# Patient Record
Sex: FEMALE | Race: WHITE | HISPANIC OR LATINO | URBAN - METROPOLITAN AREA
[De-identification: names, ages, dates, MRNs, and addresses within clinical notes are randomized per-mention and may not be internally consistent; named-entity substitution may affect disease eponyms.]

---

## 2019-01-14 ENCOUNTER — OUTPATIENT (OUTPATIENT)
Dept: OUTPATIENT SERVICES | Facility: HOSPITAL | Age: 24
LOS: 1 days | End: 2019-01-14

## 2019-01-14 ENCOUNTER — APPOINTMENT (OUTPATIENT)
Dept: MRI IMAGING | Facility: HOSPITAL | Age: 24
End: 2019-01-14
Payer: COMMERCIAL

## 2019-01-14 DIAGNOSIS — Q04.6 CONGENITAL CEREBRAL CYSTS: ICD-10-CM

## 2019-01-14 PROCEDURE — 70551 MRI BRAIN STEM W/O DYE: CPT | Mod: 26

## 2019-02-28 ENCOUNTER — INPATIENT (INPATIENT)
Facility: HOSPITAL | Age: 24
LOS: 1 days | Discharge: HOME | End: 2019-03-02
Attending: NEUROLOGICAL SURGERY | Admitting: NEUROLOGICAL SURGERY
Payer: COMMERCIAL

## 2019-02-28 ENCOUNTER — TRANSCRIPTION ENCOUNTER (OUTPATIENT)
Age: 24
End: 2019-02-28

## 2019-02-28 VITALS
TEMPERATURE: 98 F | DIASTOLIC BLOOD PRESSURE: 69 MMHG | OXYGEN SATURATION: 100 % | RESPIRATION RATE: 18 BRPM | SYSTOLIC BLOOD PRESSURE: 139 MMHG | HEART RATE: 68 BPM

## 2019-02-28 DIAGNOSIS — T85.618A BREAKDOWN (MECHANICAL) OF OTHER SPECIFIED INTERNAL PROSTHETIC DEVICES, IMPLANTS AND GRAFTS, INITIAL ENCOUNTER: ICD-10-CM

## 2019-02-28 LAB
ALBUMIN SERPL ELPH-MCNC: 4.3 G/DL — SIGNIFICANT CHANGE UP (ref 3.3–5)
ALP SERPL-CCNC: 64 U/L — SIGNIFICANT CHANGE UP (ref 40–120)
ALT FLD-CCNC: 10 U/L — SIGNIFICANT CHANGE UP (ref 4–33)
ANION GAP SERPL CALC-SCNC: 17 MMO/L — HIGH (ref 7–14)
APTT BLD: 26 SEC — LOW (ref 27.5–36.3)
AST SERPL-CCNC: 10 U/L — SIGNIFICANT CHANGE UP (ref 4–32)
BASOPHILS # BLD AUTO: 0.05 K/UL — SIGNIFICANT CHANGE UP (ref 0–0.2)
BASOPHILS NFR BLD AUTO: 0.5 % — SIGNIFICANT CHANGE UP (ref 0–2)
BILIRUB SERPL-MCNC: 0.3 MG/DL — SIGNIFICANT CHANGE UP (ref 0.2–1.2)
BLD GP AB SCN SERPL QL: NEGATIVE — SIGNIFICANT CHANGE UP
BUN SERPL-MCNC: 7 MG/DL — SIGNIFICANT CHANGE UP (ref 7–23)
CALCIUM SERPL-MCNC: 9.3 MG/DL — SIGNIFICANT CHANGE UP (ref 8.4–10.5)
CHLORIDE SERPL-SCNC: 102 MMOL/L — SIGNIFICANT CHANGE UP (ref 98–107)
CO2 SERPL-SCNC: 22 MMOL/L — SIGNIFICANT CHANGE UP (ref 22–31)
CREAT SERPL-MCNC: 0.57 MG/DL — SIGNIFICANT CHANGE UP (ref 0.5–1.3)
EOSINOPHIL # BLD AUTO: 0.05 K/UL — SIGNIFICANT CHANGE UP (ref 0–0.5)
EOSINOPHIL NFR BLD AUTO: 0.5 % — SIGNIFICANT CHANGE UP (ref 0–6)
GLUCOSE SERPL-MCNC: 99 MG/DL — SIGNIFICANT CHANGE UP (ref 70–99)
HCT VFR BLD CALC: 38.7 % — SIGNIFICANT CHANGE UP (ref 34.5–45)
HGB BLD-MCNC: 12.6 G/DL — SIGNIFICANT CHANGE UP (ref 11.5–15.5)
IMM GRANULOCYTES NFR BLD AUTO: 0.2 % — SIGNIFICANT CHANGE UP (ref 0–1.5)
INR BLD: 1.13 — SIGNIFICANT CHANGE UP (ref 0.88–1.17)
LYMPHOCYTES # BLD AUTO: 2.91 K/UL — SIGNIFICANT CHANGE UP (ref 1–3.3)
LYMPHOCYTES # BLD AUTO: 31.9 % — SIGNIFICANT CHANGE UP (ref 13–44)
MCHC RBC-ENTMCNC: 28.6 PG — SIGNIFICANT CHANGE UP (ref 27–34)
MCHC RBC-ENTMCNC: 32.6 % — SIGNIFICANT CHANGE UP (ref 32–36)
MCV RBC AUTO: 88 FL — SIGNIFICANT CHANGE UP (ref 80–100)
MONOCYTES # BLD AUTO: 0.67 K/UL — SIGNIFICANT CHANGE UP (ref 0–0.9)
MONOCYTES NFR BLD AUTO: 7.3 % — SIGNIFICANT CHANGE UP (ref 2–14)
NEUTROPHILS # BLD AUTO: 5.42 K/UL — SIGNIFICANT CHANGE UP (ref 1.8–7.4)
NEUTROPHILS NFR BLD AUTO: 59.6 % — SIGNIFICANT CHANGE UP (ref 43–77)
NRBC # FLD: 0 K/UL — LOW (ref 25–125)
PLATELET # BLD AUTO: 248 K/UL — SIGNIFICANT CHANGE UP (ref 150–400)
PMV BLD: 10.8 FL — SIGNIFICANT CHANGE UP (ref 7–13)
POTASSIUM SERPL-MCNC: 2.9 MMOL/L — CRITICAL LOW (ref 3.5–5.3)
POTASSIUM SERPL-SCNC: 2.9 MMOL/L — CRITICAL LOW (ref 3.5–5.3)
PROT SERPL-MCNC: 6.7 G/DL — SIGNIFICANT CHANGE UP (ref 6–8.3)
PROTHROM AB SERPL-ACNC: 12.9 SEC — SIGNIFICANT CHANGE UP (ref 9.8–13.1)
RBC # BLD: 4.4 M/UL — SIGNIFICANT CHANGE UP (ref 3.8–5.2)
RBC # FLD: 13.4 % — SIGNIFICANT CHANGE UP (ref 10.3–14.5)
RH IG SCN BLD-IMP: POSITIVE — SIGNIFICANT CHANGE UP
SODIUM SERPL-SCNC: 141 MMOL/L — SIGNIFICANT CHANGE UP (ref 135–145)
WBC # BLD: 9.12 K/UL — SIGNIFICANT CHANGE UP (ref 3.8–10.5)
WBC # FLD AUTO: 9.12 K/UL — SIGNIFICANT CHANGE UP (ref 3.8–10.5)

## 2019-02-28 PROCEDURE — 71046 X-RAY EXAM CHEST 2 VIEWS: CPT | Mod: 26

## 2019-02-28 PROCEDURE — 74019 RADEX ABDOMEN 2 VIEWS: CPT | Mod: 26

## 2019-02-28 PROCEDURE — 70250 X-RAY EXAM OF SKULL: CPT | Mod: 26

## 2019-02-28 RX ORDER — METOCLOPRAMIDE HCL 10 MG
10 TABLET ORAL ONCE
Qty: 0 | Refills: 0 | Status: COMPLETED | OUTPATIENT
Start: 2019-02-28 | End: 2019-02-28

## 2019-02-28 RX ORDER — SODIUM CHLORIDE 9 MG/ML
1000 INJECTION INTRAMUSCULAR; INTRAVENOUS; SUBCUTANEOUS ONCE
Qty: 0 | Refills: 0 | Status: COMPLETED | OUTPATIENT
Start: 2019-02-28 | End: 2019-02-28

## 2019-02-28 RX ORDER — POTASSIUM CHLORIDE 20 MEQ
40 PACKET (EA) ORAL ONCE
Qty: 0 | Refills: 0 | Status: COMPLETED | OUTPATIENT
Start: 2019-02-28 | End: 2019-02-28

## 2019-02-28 RX ADMIN — Medication 40 MILLIEQUIVALENT(S): at 23:26

## 2019-02-28 RX ADMIN — Medication 10 MILLIGRAM(S): at 21:50

## 2019-02-28 RX ADMIN — SODIUM CHLORIDE 1000 MILLILITER(S): 9 INJECTION INTRAMUSCULAR; INTRAVENOUS; SUBCUTANEOUS at 21:51

## 2019-02-28 RX ADMIN — SODIUM CHLORIDE 1000 MILLILITER(S): 9 INJECTION INTRAMUSCULAR; INTRAVENOUS; SUBCUTANEOUS at 22:57

## 2019-02-28 NOTE — ED ADULT TRIAGE NOTE - CHIEF COMPLAINT QUOTE
Patient brought to ER by EMS as a transfer from [facility] for a possible issue with her  Shunt. She has c/o headache, neck and back pain for the past two weeks. Patient brought to ER by EMS as a transfer from [facility] for a possible issue with her  Shunt. She has c/o headache, neck and back pain for the past two weeks.  20 gauge to right AC

## 2019-02-28 NOTE — ED PROVIDER NOTE - PSH
S/P Craniotomy    S/P  Shunt  in 2007 at [facility] [place] with Dr. Figueroa

## 2019-02-28 NOTE — ED ADULT NURSE NOTE - NSIMPLEMENTINTERV_GEN_ALL_ED
Implemented All Universal Safety Interventions:  [place] to call system. Call bell, personal items and telephone within reach. Instruct patient to call for assistance. Room bathroom lighting operational. Non-slip footwear when patient is off stretcher. Physically safe environment: no spills, clutter or unnecessary equipment. Stretcher in lowest position, wheels locked, appropriate side rails in place.

## 2019-02-28 NOTE — ED CLERICAL - CLERICAL COMMENTS
pt with [facility] craniotomy for colloid cyst &  shunt 2009 sent c/o headache, neck pain, photophobia x2 wks to see neurosx

## 2019-02-28 NOTE — ED PROVIDER NOTE - ATTENDING CONTRIBUTION TO CARE
Attending note:   After face to face evaluation of this patient, I concur with above noted hx, pe, and care plan for this patient.  24 y/o F with arachnoid cyst with shunt with two weeks of neck pain and ha not responsive to tylenol and advil.    No new drugs, etoh, exercise.    UCG negative at [facility] today.    Evaluation in progress

## 2019-02-28 NOTE — ED ADULT NURSE NOTE - CHIEF COMPLAINT QUOTE
Patient brought to ER by EMS as a transfer from [facility] for a possible issue with her  Shunt. She has c/o headache, neck and back pain for the past two weeks.  20 gauge to right AC

## 2019-02-28 NOTE — ED ADULT NURSE NOTE - OBJECTIVE STATEMENT
pt is in bed  A and O x3 in NAD, PERRLA extremities are strong and equal denies photophobia denies changes in vision stiff neck fever or chills. pt states " they transferred me here because my  shunt was placed here, they told me it is dislodged". pt accompanied with HPI .

## 2019-02-28 NOTE — ED PROVIDER NOTE - CARE PLAN
Principal Discharge DX:	Shunt malfunction  Secondary Diagnosis:	S/P  shunt  Secondary Diagnosis:	Headache

## 2019-02-28 NOTE — ED PROVIDER NOTE - OBJECTIVE STATEMENT
22 yo female with PMH of craniotomy for colloid cyst &  shunt 2009 sent by [facility] c/o headache, neck pain, photophobia x2-3 weeks. Pt given toradol at [facility] with relief of her headache, states it is currently 1/10, previously at [facility] was "20/10"

## 2019-03-01 DIAGNOSIS — T85.618A BREAKDOWN (MECHANICAL) OF OTHER SPECIFIED INTERNAL PROSTHETIC DEVICES, IMPLANTS AND GRAFTS, INITIAL ENCOUNTER: ICD-10-CM

## 2019-03-01 LAB
ANION GAP SERPL CALC-SCNC: 11 MMO/L — SIGNIFICANT CHANGE UP (ref 7–14)
BUN SERPL-MCNC: 6 MG/DL — LOW (ref 7–23)
CALCIUM SERPL-MCNC: 9.2 MG/DL — SIGNIFICANT CHANGE UP (ref 8.4–10.5)
CHLORIDE SERPL-SCNC: 104 MMOL/L — SIGNIFICANT CHANGE UP (ref 98–107)
CLARITY CSF: CLEAR — SIGNIFICANT CHANGE UP
CO2 SERPL-SCNC: 24 MMOL/L — SIGNIFICANT CHANGE UP (ref 22–31)
COLOR CSF: COLORLESS — SIGNIFICANT CHANGE UP
CREAT SERPL-MCNC: 0.54 MG/DL — SIGNIFICANT CHANGE UP (ref 0.5–1.3)
GLUCOSE CSF-MCNC: 62 MG/DL — SIGNIFICANT CHANGE UP (ref 40–70)
GLUCOSE SERPL-MCNC: 90 MG/DL — SIGNIFICANT CHANGE UP (ref 70–99)
GRAM STN CSF: SIGNIFICANT CHANGE UP
HCG SERPL-ACNC: < 5 MIU/ML — SIGNIFICANT CHANGE UP
HCG SERPL-ACNC: < 5 MIU/ML — SIGNIFICANT CHANGE UP
HCG UR-SCNC: NEGATIVE — SIGNIFICANT CHANGE UP
MONOCYTES # CSF: 100 % — SIGNIFICANT CHANGE UP
NRBC NFR CSF: 1 CELL/UL — SIGNIFICANT CHANGE UP (ref 0–5)
POTASSIUM SERPL-MCNC: 3.9 MMOL/L — SIGNIFICANT CHANGE UP (ref 3.5–5.3)
POTASSIUM SERPL-SCNC: 3.9 MMOL/L — SIGNIFICANT CHANGE UP (ref 3.5–5.3)
PROT CSF-MCNC: 9.2 MG/DL — LOW (ref 15–40)
RBC # CSF: < 1 CELL/UL — HIGH (ref 0–0)
SODIUM SERPL-SCNC: 139 MMOL/L — SIGNIFICANT CHANGE UP (ref 135–145)
SP GR UR: 1.01 — SIGNIFICANT CHANGE UP (ref 1–1.03)
SPECIMEN SOURCE: SIGNIFICANT CHANGE UP
TOTAL CELLS COUNTED, SPINAL FLUID: 1 CELLS — SIGNIFICANT CHANGE UP
XANTHOCHROMIA: SIGNIFICANT CHANGE UP

## 2019-03-01 PROCEDURE — 49329 UNLSTD LAPS PX ABD PERTM&OMN: CPT | Mod: 62

## 2019-03-01 PROCEDURE — 62258 REPLACE BRAIN CAVITY SHUNT: CPT | Mod: 62

## 2019-03-01 RX ORDER — SODIUM CHLORIDE 9 MG/ML
3 INJECTION INTRAMUSCULAR; INTRAVENOUS; SUBCUTANEOUS EVERY 8 HOURS
Qty: 0 | Refills: 0 | Status: DISCONTINUED | OUTPATIENT
Start: 2019-03-01 | End: 2019-03-02

## 2019-03-01 RX ORDER — SODIUM CHLORIDE 9 MG/ML
1000 INJECTION, SOLUTION INTRAVENOUS
Qty: 0 | Refills: 0 | Status: DISCONTINUED | OUTPATIENT
Start: 2019-03-01 | End: 2019-03-01

## 2019-03-01 RX ORDER — CEFAZOLIN SODIUM 1 G
1000 VIAL (EA) INJECTION EVERY 8 HOURS
Qty: 0 | Refills: 0 | Status: COMPLETED | OUTPATIENT
Start: 2019-03-01 | End: 2019-03-02

## 2019-03-01 RX ORDER — ACETAMINOPHEN 500 MG
650 TABLET ORAL EVERY 6 HOURS
Qty: 0 | Refills: 0 | Status: DISCONTINUED | OUTPATIENT
Start: 2019-03-01 | End: 2019-03-02

## 2019-03-01 RX ORDER — OXYCODONE HYDROCHLORIDE 5 MG/1
5 TABLET ORAL EVERY 4 HOURS
Qty: 0 | Refills: 0 | Status: DISCONTINUED | OUTPATIENT
Start: 2019-03-01 | End: 2019-03-02

## 2019-03-01 RX ORDER — DOCUSATE SODIUM 100 MG
100 CAPSULE ORAL THREE TIMES A DAY
Qty: 0 | Refills: 0 | Status: DISCONTINUED | OUTPATIENT
Start: 2019-03-01 | End: 2019-03-02

## 2019-03-01 RX ADMIN — Medication 100 MILLIGRAM(S): at 21:33

## 2019-03-01 RX ADMIN — Medication 100 MILLIGRAM(S): at 18:34

## 2019-03-01 RX ADMIN — SODIUM CHLORIDE 3 MILLILITER(S): 9 INJECTION INTRAMUSCULAR; INTRAVENOUS; SUBCUTANEOUS at 22:35

## 2019-03-01 RX ADMIN — SODIUM CHLORIDE 80 MILLILITER(S): 9 INJECTION, SOLUTION INTRAVENOUS at 18:32

## 2019-03-01 RX ADMIN — Medication 100 MILLIGRAM(S): at 08:08

## 2019-03-01 RX ADMIN — SODIUM CHLORIDE 80 MILLILITER(S): 9 INJECTION, SOLUTION INTRAVENOUS at 01:56

## 2019-03-01 NOTE — H&P ADULT - PSH
S/P Craniotomy    S/P  [place]  [place] [facility] [place]

## 2019-03-01 NOTE — CHART NOTE - NSCHARTNOTEFT_GEN_A_CORE
B TEAM SURGERY POST-OPERATIVE NOTE    Subjective:  Patient is s/p Lap assisted removal of distal  shunt catheter and replacement with new  shunt catheter. Patient tolerated the procedure well and was transferred to PACU without any issues. She denies any headaches or dizziness since the procedure. She denies any abdominal pain at this time. Patient denies any n/v, chest pain, or SOB.      Objective:  Vital Signs Last 24 Hrs  T(C): 36.6 (01 Mar 2019 15:00), Max: 36.9 (01 Mar 2019 03:10)  T(F): 97.9 (01 Mar 2019 15:00), Max: 98.4 (01 Mar 2019 03:10)  HR: 78 (01 Mar 2019 15:00) (58 - 93)  BP: 103/82 (01 Mar 2019 15:00) (103/82 - 139/69)  BP(mean): 82 (01 Mar 2019 14:45) (68 - 82)  RR: 17 (01 Mar 2019 15:00) (11 - 18)  SpO2: 100% (01 Mar 2019 15:00) (98% - 100%)  I&O's Detail    ceFAZolin   IVPB 1000  ceFAZolin   IVPB 1000      Physical Exam:  General: NAD, resting comfortably in bed   Pulmonary: Nonlabored breathing, no respiratory distress on RA  Cardiovascular: 78 103/82   Abdominal: soft, non-distended, nontender around incisions, lap incisions are clean and dry with steristrips in place  Extremities: [facility]      LABS:                        12.6   9.12  )-----------( 248      ( 28 Feb 2019 21:50 )             38.7     03-01    139  |  104  |  6<L>  ----------------------------<  90  3.9   |  24  |  0.54    Ca    9.2      01 Mar 2019 07:20    TPro  6.7  /  Alb  4.3  /  TBili  0.3  /  DBili  x   /  AST  10  /  ALT  10  /  AlkPhos  64  02-28    PT/INR - ( 28 Feb 2019 21:50 )   PT: 12.9 SEC;   INR: 1.13          PTT - ( 28 Feb 2019 21:50 )  PTT:26.0 SEC    Assessment:   23y Female, now several hours post-op from a __.     Plan:  - Pain control as needed  - __ for DVT ppx  - OOB and ambulating as tolerated  - F/u AM labs B TEAM SURGERY POST-OPERATIVE NOTE    Subjective:  Patient is s/p Lap assisted removal of distal  shunt catheter and replacement with new  shunt catheter. Patient tolerated the procedure well and was transferred to PACU without any issues. She denies any headaches or dizziness since the procedure. She denies any abdominal pain at this time. and denies any n/v, chest pain, or SOB.      Objective:  Vital Signs Last 24 Hrs  T(C): 36.6 (01 Mar 2019 15:00), Max: 36.9 (01 Mar 2019 03:10)  T(F): 97.9 (01 Mar 2019 15:00), Max: 98.4 (01 Mar 2019 03:10)  HR: 78 (01 Mar 2019 15:00) (58 - 93)  BP: 103/82 (01 Mar 2019 15:00) (103/82 - 139/69)  BP(mean): 82 (01 Mar 2019 14:45) (68 - 82)  RR: 17 (01 Mar 2019 15:00) (11 - 18)  SpO2: 100% (01 Mar 2019 15:00) (98% - 100%)  I&O's Detail    ceFAZolin   IVPB 1000  ceFAZolin   IVPB 1000    Physical Exam:  General: NAD, resting comfortably in bed   Pulmonary: Nonlabored breathing, no respiratory distress on RA  Cardiovascular: 78 103/82 pulse is regular  Abdominal: soft, non-distended, nontender around incisions, lap incisions x3 are clean and dry with steristrips in place scant serosang staining on steris  Extremities: [facility]      LABS:                        12.6   9.12  )-----------( 248      ( 28 Feb 2019 21:50 )             38.7     03-01    139  |  104  |  6<L>  ----------------------------<  90  3.9   |  24  |  0.54    Ca    9.2      01 Mar 2019 07:20    TPro  6.7  /  Alb  4.3  /  TBili  0.3  /  DBili  x   /  AST  10  /  ALT  10  /  AlkPhos  64  02-28    PT/INR - ( 28 Feb 2019 21:50 )   PT: 12.9 SEC;   INR: 1.13          PTT - ( 28 Feb 2019 21:50 )  PTT:26.0 SEC    Assessment:  23y Female, now several hours post-op from a Lap assisted removal of distal  shunt catheter and replacement with new  shunt catheter.     Plan:  - Advance diet as tolerated  - Pain control as needed  - DVT ppx  - OOB and ambulating as tolerated

## 2019-03-01 NOTE — BRIEF OPERATIVE NOTE - PROCEDURE
<<-----Click on this checkbox to enter Procedure Revision or removal of ventriculoperitoneal shunt  03/01/2019    Active  Dari Al

## 2019-03-01 NOTE — H&P ADULT - NSHPPHYSICALEXAM_GEN_ALL_CORE
WDWN female in NAD  Vital Signs Last 24 Hrs  T(C): 36.5 (28 Feb 2019 23:35), Max: 36.7 (28 Feb 2019 19:02)  T(F): 97.7 (28 Feb 2019 23:35), Max: 98 (28 Feb 2019 19:02)  HR: 66 (28 Feb 2019 23:35) (66 - 68)  BP: 127/71 (28 Feb 2019 23:35) (127/71 - 139/69)  BP(mean): --  RR: 17 (28 Feb 2019 23:35) (17 - 18)  SpO2: 100% (28 Feb 2019 23:35) (100% - 100%)    AAO X 3  PERRLA, EOMI  CN 2-12 grossly intact  CHURCHILL strength 5/5, no drift  SILT  gait normal

## 2019-03-01 NOTE — BRIEF OPERATIVE NOTE - OPERATION/FINDINGS
Distal catheter disconnected at the neck. New Catheter tunneled and passed with assistance of General Surgery. Old Distal Catheter retrieved.

## 2019-03-01 NOTE — CONSULT NOTE ADULT - SUBJECTIVE AND OBJECTIVE BOX
SURGERY CONSULT NOTE  --------------------------------------------------------------------------------------------    HPI: 23F with history of colloid cyst of third ventricle s/p resection and  shunt placement in 2007, now presenting with disconnected shunt with migration into the abdomen. She has been having headaches, nausea, and emesis for almost two weeks. She had imaging at [facility] that demonstrated a malpositioned shunt and came to [facility] for evaluation. X-ray was done showing a disconnected shunt in the left hemiabdomen.     She is hemodynamically normal. No fevers, chills, or obstructive symptoms.       PAST MEDICAL & SURGICAL HISTORY:  H/O headache  H/O hydrocephalus  Colloid Cyst of Third Ventricle  S/P  Shunt: in 2007 at [facility] [place]. Figueroa  S/P Craniotomy    FAMILY HISTORY:  No pertinent family history in first degree relatives    ALLERGIES: No Known Allergies      CURRENT MEDICATIONS  MEDICATIONS (STANDING): docusate sodium 100 milliGRAM(s) Oral three times a day  lactated ringers. 1000 milliLiter(s) IV Continuous <Continuous>    MEDICATIONS (PRN):acetaminophen   Tablet .. 650 milliGRAM(s) Oral every 6 hours PRN Temp greater or equal to 38C (100.4F), Mild Pain (1 - 3)    --------------------------------------------------------------------------------------------    Vitals:   T(C): 36.8 (03-01-19 @ 00:33), Max: 36.8 (03-01-19 @ 00:33)  HR: 65 (03-01-19 @ 00:33) (65 - 68)  BP: 132/73 (03-01-19 @ 00:33) (127/71 - 139/69)  RR: 16 (03-01-19 @ 00:33) (16 - 18)  SpO2: 100% (03-01-19 @ 00:33) (100% - 100%)      PHYSICAL EXAM:  General: Alert, NAD  Neuro: A+Ox3  HEENT: NC/AT, no asymmetry, no scleral icterus  Cardio: RRR, nml S1/S2  Resp: Airway patent, unlabored breathing  GI/Abd: Soft, NT/ND, no rebound/guarding, no masses palpated  Ext: Warm, no edema, full ROM  --------------------------------------------------------------------------------------------    LABS  CBC (02-28 @ 21:50)                              12.6                           9.12    )----------------(  248        59.6  % Neutrophils, 31.9  % Lymphocytes, ANC: 5.42                                38.7      BMP (02-28 @ 21:50)             141     |  102     |  7     		Ca++ --      Ca 9.3                ---------------------------------( 99    		Mg --                 2.9<LL>  |  22      |  0.57  			Ph --        LFTs (02-28 @ 21:50)      TPro 6.7 / Alb 4.3 / TBili 0.3 / DBili -- / AST 10 / ALT 10 / AlkPhos 64    Coags (02-28 @ 21:50)  aPTT 26.0<L> / INR 1.13 / PT 12.9      --------------------------------------------------------------------------------------------    IMAGING    Xray Shuntogram  Shunt (02.28.19 @ 23:12) >  INTERPRETATION:   shunt identified originating from the right lateral   ventricle with tubing extending to the upper right neck. Additional   tubing is seen coiled in the lower abdomen. No shunt catheter is seen in   the chest or upper abdomen. These findings are concerning for shunt   disconnection and migration.

## 2019-03-01 NOTE — CONSULT NOTE ADULT - ASSESSMENT
23F with history of colloid cyst of third ventricle s/p resection and  shunt placement in 2007, now presenting with disconnected shunt with migration into the abdomen.    - Discussed with neurosurgery, planning for OR tomorrow to retrieve shunt disconnected distal catheter and position new catheter  - General surgery will be available  - Will discuss with attending, Dr. Gomez team surgery  Pager [phone]

## 2019-03-01 NOTE — H&P ADULT - PROBLEM SELECTOR PLAN 1
Admit  Preop for revision  General surgery consult for retrieval of distal catheter  Keep NPO, IV fluids  Replace potassium, follow up repeat BMP in AM

## 2019-03-01 NOTE — PROGRESS NOTE ADULT - SUBJECTIVE AND OBJECTIVE BOX
Post op Note    Dx: VPS malfunction    23y    Female   s/p VPS distal revision POD #0.          T(C): 36.6 (03-01-19 @ 15:00), Max: 36.9 (03-01-19 @ 03:10)  HR: 78 (03-01-19 @ 15:00) (58 - 93)  BP: 103/82 (03-01-19 @ 15:00) (103/82 - 139/69)  RR: 17 (03-01-19 @ 15:00) (11 - 18)  SpO2: 100% (03-01-19 @ 15:00) (98% - 100%)  Wt(kg): --      Physical exam  Awake, alert, oriented x 3, PERRL, EOMI  Follows commands, speech clear  CHURCHILL X4 with good strength   Incision: C/D/I

## 2019-03-01 NOTE — PRE-OP CHECKLIST - 2.
Pt arrived to ASU w/ IV in Right AC, states IV was placed in [place]. Right AC PIV DCd, Left hand #20 placed in ASU

## 2019-03-01 NOTE — BRIEF OPERATIVE NOTE - OPERATION/FINDINGS
Veress needle abdominal entry attempted but insufflation was unsuccessful.   Abdominal entry and insufflation with Rajiv technique. 10 mm port in umbilicus. Additional 5 mm port in LUQ.   Distal VPS catheter identified in LLQ, removed in one piece.  New VPS catheter tunneled to RUQ and introduced into peritoneal cavity using peel-away introducer.  Umbilical fascia closed with PDS.

## 2019-03-01 NOTE — H&P ADULT - NSHPLABSRESULTS_GEN_ALL_CORE
12.6   9.12  )-----------( 248      ( 28 Feb 2019 21:50 )             38.7     02-28    141  |  102  |  7   ----------------------------<  99  2.9<LL>   |  22  |  0.57    Ca    9.3      28 Feb 2019 21:50    TPro  6.7  /  Alb  4.3  /  TBili  0.3  /  DBili  x   /  AST  10  /  ALT  10  /  AlkPhos  64  02-28    PT/INR - ( 28 Feb 2019 21:50 )   PT: 12.9 SEC;   INR: 1.13          PTT - ( 28 Feb 2019 21:50 )  PTT:26.0 SEC    Type + Screen (02.28.19 @ 21:38)    ABO Interpretation: B    Rh Interpretation: Positive    Antibody Screen: Negative    < from: Xray Shuntogram  Shunt (02.28.19 @ 23:12) >    INTERPRETATION:   shunt identified originating from the right lateral   ventricle with tubing extending to the upper right neck. Additional   tubing is seen coiled in the lower abdomen. No shunt catheter is seen in   the chest or upper abdomen. These findings are concerning for shunt   disconnection and migration.    < end of copied text >

## 2019-03-01 NOTE — H&P ADULT - NSHPREVIEWOFSYSTEMS_GEN_ALL_CORE
14 point review of systems   Musculoskeletal: Neck and shoulder pain and tightness  Neuro: headache  GI: Vomiting

## 2019-03-01 NOTE — H&P ADULT - HISTORY OF PRESENT ILLNESS
22 YO female with hx of a third ventricle colloid cyst, s/p resection and subsequent placement of  shunt C/O 1 week of neck and shoulder pain and tightness, and intermittent headaches which have been severe at times. She states she had 3 episodes of vomiting in the past week and that her headache is better in the afternoons, worse upon awakening. No focal motor or sensory loss, no lethargy

## 2019-03-02 ENCOUNTER — TRANSCRIPTION ENCOUNTER (OUTPATIENT)
Age: 24
End: 2019-03-02

## 2019-03-02 VITALS
RESPIRATION RATE: 18 BRPM | TEMPERATURE: 98 F | HEART RATE: 92 BPM | OXYGEN SATURATION: 98 % | DIASTOLIC BLOOD PRESSURE: 62 MMHG | SYSTOLIC BLOOD PRESSURE: 130 MMHG

## 2019-03-02 DIAGNOSIS — Z98.2 PRESENCE OF CEREBROSPINAL FLUID DRAINAGE DEVICE: ICD-10-CM

## 2019-03-02 RX ORDER — DOCUSATE SODIUM 100 MG
1 CAPSULE ORAL
Qty: 0 | Refills: 0 | COMMUNITY
Start: 2019-03-02

## 2019-03-02 RX ORDER — ACETAMINOPHEN 500 MG
2 TABLET ORAL
Qty: 0 | Refills: 0 | COMMUNITY
Start: 2019-03-02

## 2019-03-02 RX ORDER — OXYCODONE HYDROCHLORIDE 5 MG/1
1 TABLET ORAL
Qty: 18 | Refills: 0 | OUTPATIENT
Start: 2019-03-02 | End: 2019-03-04

## 2019-03-02 RX ADMIN — SODIUM CHLORIDE 3 MILLILITER(S): 9 INJECTION INTRAMUSCULAR; INTRAVENOUS; SUBCUTANEOUS at 05:43

## 2019-03-02 RX ADMIN — Medication 100 MILLIGRAM(S): at 03:08

## 2019-03-02 RX ADMIN — Medication 100 MILLIGRAM(S): at 10:53

## 2019-03-02 NOTE — DISCHARGE NOTE ADULT - PATIENT PORTAL LINK FT
You can access the [facility] Patient Portal, offered by [facility], by registering with the following website: [email or web address]

## 2019-03-02 NOTE — DISCHARGE NOTE ADULT - HOSPITAL COURSE
HPI:  24 YO female with hx of a third ventricle colloid cyst, s/p resection and subsequent placement of  shunt C/O 1 week of neck and shoulder pain and tightness, and intermittent headaches which have been severe at times. She states she had 3 episodes of vomiting in the past week and that her headache is better in the afternoons, worse upon awakening. No focal motor or sensory loss, no lethargy (01 Mar 2019 00:01)  Patient was found on imaging to have a broken catheter in her neck. She underwent CSF tap in the ER for headache. CSF NGTD. Was taken to the OR with general surgery to re insert her shunt tubing in her abdomen. Doing well morning of discharge was OOB ambulating, pain under control. Clear for d/c home today.

## 2019-03-02 NOTE — PROGRESS NOTE ADULT - SUBJECTIVE AND OBJECTIVE BOX
ACUTE CARE SURGERY  Pager: [phone]    STATUS POST:  revision of VPS    POST OPERATIVE DAY #1      INTERVAL EVENTS/SUBJECTIVE:   Tolerating regular diet.   Has incisional pain well-controlled.  ______________________________________________  OBJECTIVE:   T(C): 36.8 (03-02-19 @ 05:12), Max: 36.8 (03-02-19 @ 05:12)  HR: 100 (03-02-19 @ 05:12) (58 - 100)  BP: 103/48 (03-02-19 @ 05:12) (103/48 - 130/69)  RR: 17 (03-02-19 @ 05:12) (11 - 18)  SpO2: 100% (03-02-19 @ 05:12) (98% - 100%)  Wt(kg): --  CAPILLARY BLOOD GLUCOSE        I&O's Detail    01 Mar 2019 07:01  -  02 Mar 2019 07:00  --------------------------------------------------------  IN:    IV PiggyBack: 100 mL    lactated ringers.: 320 mL    Oral Fluid: 200 mL  Total IN: 620 mL    OUT:  Total OUT: 0 mL    Total NET: 620 mL          Physical exam:  Gen: NAD  Abdomen: Soft, nondistended, incisional tenderness. Steristrips intact.  ______________________________________________  LABS:  CBC Full  -  ( 28 Feb 2019 21:50 )  WBC Count : 9.12 K/uL  Hemoglobin : 12.6 g/dL  Hematocrit : 38.7 %  Platelet Count - Automated : 248 K/uL  Mean Cell Volume : 88.0 fL  Mean Cell Hemoglobin : 28.6 pg  Mean Cell Hemoglobin Concentration : 32.6 %  Auto Neutrophil # : 5.42 K/uL  Auto Lymphocyte # : 2.91 K/uL  Auto Monocyte # : 0.67 K/uL  Auto Eosinophil # : 0.05 K/uL  Auto Basophil # : 0.05 K/uL  Auto Neutrophil % : 59.6 %  Auto Lymphocyte % : 31.9 %  Auto Monocyte % : 7.3 %  Auto Eosinophil % : 0.5 %  Auto Basophil % : 0.5 %    03-01    139  |  104  |  6<L>  ----------------------------<  90  3.9   |  24  |  0.54    Ca    9.2      01 Mar 2019 07:20    TPro  6.7  /  Alb  4.3  /  TBili  0.3  /  DBili  x   /  AST  10  /  ALT  10  /  AlkPhos  64  02-28

## 2019-03-02 NOTE — DISCHARGE NOTE ADULT - FUNCTIONAL SCREEN CURRENT LEVEL: BATHING, MLM
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 Advised patient to call our office with decreased vision or increased symptoms. 0 = independent

## 2019-03-02 NOTE — DISCHARGE NOTE ADULT - CARE PROVIDER_API CALL
Polo Ye)  Pediatrics Neurosurgery  [address] [place] [address], [address]  [place] [address]  Phone: [phone]  Fax: [phone]  Follow Up Time: 1 week

## 2019-03-02 NOTE — DISCHARGE NOTE ADULT - INSTRUCTIONS
Call MD / go to Emergency department if :  1.fever greater than 100.4F  2.chills nausea / vomiting  3.new redness , pain, pus or discharged at the incision site.

## 2019-03-02 NOTE — DISCHARGE NOTE ADULT - MEDICATION SUMMARY - MEDICATIONS TO TAKE
I will START or STAY ON the medications listed below when I get home from the hospital:    acetaminophen 325 mg oral tablet  -- 2 tab(s) by mouth every 6 hours, As needed, Temp greater or equal to 38C (100.4F), Mild Pain (1 - 3)  -- Indication: For pain    oxyCODONE 5 mg oral tablet  -- 1 tab(s) by mouth every 4 hours x 3 days, As Needed -Moderate Pain (4 - 6) MDD:6  -- Indication: For pain    docusate sodium 100 mg oral capsule  -- 1 cap(s) by mouth 3 times a day  -- Indication: For GI

## 2019-03-02 NOTE — PROGRESS NOTE ADULT - ASSESSMENT
Assessment/Plan: 23y Female s/p revision of VPS.     - Diet as tolerated  - Leave steristrips intact. May shower today.   - Pain control   - Follow up with Dr. Mitchell two weeks postop    Pager [phone]

## 2019-03-02 NOTE — DISCHARGE NOTE ADULT - CARE PLAN
Principal Discharge DX:	Shunt malfunction, initial encounter  Goal:	s/p distal revision  Assessment and plan of treatment:	May shower and shampoo post op day #4   Pain control  Follow up with Dr Ye in 1 week for wound check

## 2019-03-02 NOTE — PROGRESS NOTE ADULT - SUBJECTIVE AND OBJECTIVE BOX
No issues overnight  Vital Signs Last 24 Hrs  T(C): 36.7 (02 Mar 2019 01:07), Max: 36.9 (01 Mar 2019 03:10)  T(F): 98 (02 Mar 2019 01:07), Max: 98.4 (01 Mar 2019 03:10)  HR: 70 (02 Mar 2019 01:07) (58 - 93)  BP: 105/72 (02 Mar 2019 01:07) (103/82 - 130/69)  BP(mean): 82 (01 Mar 2019 14:45) (68 - 82)  RR: 17 (02 Mar 2019 01:07) (11 - 18)  SpO2: 99% (02 Mar 2019 01:07) (98% - 100%)    AAO X 3  PERRLA, EOMI  CN 2-12 grossly intact  CHURCHILL strength 5/5    MEDICATIONS  (STANDING):  ceFAZolin   IVPB 1000 milliGRAM(s) IV Intermittent every 8 hours  docusate sodium 100 milliGRAM(s) Oral three times a day  sodium chloride 0.9% lock flush 3 milliLiter(s) IV Push every 8 hours    MEDICATIONS  (PRN):  acetaminophen   Tablet .. 650 milliGRAM(s) Oral every 6 hours PRN Temp greater or equal to 38C (100.4F), Mild Pain (1 - 3)  oxyCODONE    IR 5 milliGRAM(s) Oral every 4 hours PRN Moderate Pain (4 - 6)

## 2019-03-02 NOTE — DISCHARGE NOTE ADULT - PLAN OF CARE
s/p distal revision May shower and shampoo post op day #4   Pain control  Follow up with Dr Ye in 1 week for wound check

## 2019-03-06 LAB — BACTERIA CSF CULT: SIGNIFICANT CHANGE UP

## 2019-07-24 NOTE — PROGRESS NOTE ADULT - SUBJECTIVE AND OBJECTIVE BOX
Procedure Note    Patient was complaining of severe headache that has been worsening. Dr Figueroa evaluated in ER and decided to tap shunt.     Under sterile procedure, VPS accessed with 23 gauge butterfly needle and 22cc of clear CSF was removed and sent for analysis.   No issue with tap. Patient did feel better post tap.   Will plan for OR today for revision. Bi-Rhombic Flap Text: The defect edges were debeveled with a #15 scalpel blade.  Given the location of the defect and the proximity to free margins a bi-rhombic flap was deemed most appropriate.  Using a sterile surgical marker, an appropriate rhombic flap was drawn incorporating the defect. The area thus outlined was incised deep to adipose tissue with a #15 scalpel blade.  The skin margins were undermined to an appropriate distance in all directions utilizing iris scissors.

## 2021-03-23 ENCOUNTER — OUTPATIENT (OUTPATIENT)
Dept: OUTPATIENT SERVICES | Facility: HOSPITAL | Age: 26
LOS: 1 days | End: 2021-03-23

## 2021-03-23 ENCOUNTER — APPOINTMENT (OUTPATIENT)
Dept: MRI IMAGING | Facility: HOSPITAL | Age: 26
End: 2021-03-23
Payer: COMMERCIAL

## 2021-03-23 DIAGNOSIS — Q04.6 CONGENITAL CEREBRAL CYSTS: ICD-10-CM

## 2021-03-23 PROCEDURE — 70553 MRI BRAIN STEM W/O & W/DYE: CPT | Mod: 26

## 2024-01-08 NOTE — ED PROVIDER NOTE - CPE EDP HEME LYMPH NORM
Reports no issues for now.  Off Thiola, and less problems.   normal... Well appearing, well nourished, awake, alert, oriented to person, place, time/situation and in no apparent distress. normal...

## 2024-04-05 NOTE — ED ADULT NURSE NOTE - NS ED NOTE ABUSE RESPONSE YN
General Discharge Instructions   PLEASE READ YOUR DISCHARGE INSTRUCTIONS ENTIRELY AS IT CONTAINS IMPORTANT INFORMATION.  If you were prescribed a narcotic or controlled medication, do not drive or operate heavy equipment or machinery while taking these medications.  If you were prescribed antibiotics, please take them to completion.  You must understand that you've received an Urgent Care treatment only and that you may be released before all your medical problems are known or treated. You, the patient, will arrange for follow up care as instructed.    OVER THE COUNTER RECOMMENDATIONS/SUGGESTIONS.    Make sure to stay well hydrated.    Use Nasal Saline to mechanically move any post nasal drip from your eustachian tube or from the back of your throat.    Use warm salt water gargles to ease your throat pain. Warm salt water gargles as needed for sore throat- 1/2 tsp salt to 1 cup warm water, gargle as desired.    Use an antihistamine such as Claritin, Zyrtec or Allegra to dry you out.    Use pseudoephedrine (behind the counter) to decongest. Pseudoephedrine 30 mg up to 240 mg /day. It can raise your blood pressure and give you palpitations.    Use mucinex (guaifenesin) to break up mucous up to 2400mg/day to loosen any mucous.    The mucinex DM pill has a cough suppressant that can be sedating. It can be used at night to stop the tickle at the back of your throat.    You can use Mucinex D (it has guaifenesin and a high dose of pseudoephedrine) in the mornings to help decongest.    Use Afrin in each nare for no longer than 3 days, as it is addictive. It can also dry out your mucous membranes and cause elevated blood pressure. This is especially useful if you are flying.    Use Flonase 1-2 sprays/nostril per day. It is a local acting steroid nasal spray, if you develop a bloody nose, stop using the medication immediately.    Sometimes Nyquil at night is beneficial to help you get some rest, however it is sedating and it  does have an antihistamine, and tylenol.    Honey is a natural cough suppressant that can be used.    Tylenol up to 4,000 mg a day is safe for short periods and can be used for body aches, pain, and fever. However in high doses and prolonged use it can cause liver irritation.    Ibuprofen is a non-steroidal anti-inflammatory that can be used for body aches, pain, and fever.However it can also cause stomach irritation if over used.     Follow up with your PCP or specialty clinic as instructed in the next 2-3 days if not improved or as needed. You can call [phone] to schedule an appointment with appropriate provider.      If you condition worsens, we recommend that you receive another evaluation at the emergency room immediately or contact your primary medical clinic's after hours call service to discuss your concerns.      Please return here or go to the Emergency Department for any concerns or worsening condition.   You can also call [phone] to schedule an appointment with the appropriate provider.    Please return here or go to the Emergency Department for any concerns or worsening of condition.    Thank you for choosing [facility]!    Our goal in the Urgent Care is to always provide outstanding medical care. You may receive a survey by mail or e-mail in the next week regarding your experience today. We would greatly appreciate you completing and returning the survey. Your feedback provides us with a way to recognize our staff who provide very good care, and it helps us learn how to improve when your experience was below our aspiration of excellence.      We appreciate you trusting us with your medical care. We hope you feel better soon. We will be happy to take care of you for all of your future medical needs.    Sincerely,    GAUTAM Tillman     Yes